# Patient Record
Sex: MALE | Race: WHITE | NOT HISPANIC OR LATINO | Employment: FULL TIME | URBAN - METROPOLITAN AREA
[De-identification: names, ages, dates, MRNs, and addresses within clinical notes are randomized per-mention and may not be internally consistent; named-entity substitution may affect disease eponyms.]

---

## 2020-05-20 ENCOUNTER — HOSPITAL ENCOUNTER (EMERGENCY)
Facility: HOSPITAL | Age: 35
Discharge: HOME | End: 2020-05-20
Attending: EMERGENCY MEDICINE | Admitting: EMERGENCY MEDICINE
Payer: COMMERCIAL

## 2020-05-20 ENCOUNTER — APPOINTMENT (EMERGENCY)
Dept: RADIOLOGY | Facility: HOSPITAL | Age: 35
End: 2020-05-20
Payer: COMMERCIAL

## 2020-05-20 VITALS
HEIGHT: 70 IN | HEART RATE: 88 BPM | RESPIRATION RATE: 20 BRPM | TEMPERATURE: 98.1 F | OXYGEN SATURATION: 96 % | SYSTOLIC BLOOD PRESSURE: 138 MMHG | DIASTOLIC BLOOD PRESSURE: 81 MMHG | BODY MASS INDEX: 37.62 KG/M2 | WEIGHT: 262.79 LBS

## 2020-05-20 DIAGNOSIS — S93.402A SPRAIN OF LEFT ANKLE, UNSPECIFIED LIGAMENT, INITIAL ENCOUNTER: Primary | ICD-10-CM

## 2020-05-20 PROCEDURE — 73610 X-RAY EXAM OF ANKLE: CPT

## 2020-05-20 PROCEDURE — 99283 EMERGENCY DEPT VISIT LOW MDM: CPT

## 2020-05-20 PROCEDURE — 99284 EMERGENCY DEPT VISIT MOD MDM: CPT | Performed by: NURSE PRACTITIONER

## 2020-05-20 RX ORDER — NAPROXEN 500 MG/1
500 TABLET ORAL 2 TIMES DAILY WITH MEALS
Qty: 20 TABLET | Refills: 0 | Status: SHIPPED | OUTPATIENT
Start: 2020-05-20 | End: 2020-05-30

## 2022-11-30 ENCOUNTER — OFFICE VISIT (OUTPATIENT)
Dept: OBGYN CLINIC | Facility: MEDICAL CENTER | Age: 37
End: 2022-11-30

## 2022-11-30 VITALS
HEIGHT: 70 IN | WEIGHT: 246 LBS | DIASTOLIC BLOOD PRESSURE: 81 MMHG | SYSTOLIC BLOOD PRESSURE: 119 MMHG | HEART RATE: 73 BPM | BODY MASS INDEX: 35.22 KG/M2

## 2022-11-30 DIAGNOSIS — M25.571 ACUTE RIGHT ANKLE PAIN: Primary | ICD-10-CM

## 2022-11-30 RX ORDER — ANASTROZOLE 1 MG/1
TABLET ORAL
COMMUNITY
Start: 2022-11-15

## 2022-11-30 RX ORDER — VALACYCLOVIR HYDROCHLORIDE 500 MG/1
TABLET, FILM COATED ORAL
COMMUNITY
Start: 2022-11-15

## 2022-11-30 RX ORDER — TESTOSTERONE CYPIONATE 200 MG/ML
INJECTION INTRAMUSCULAR
COMMUNITY
Start: 2022-10-14

## 2022-11-30 NOTE — PROGRESS NOTES
1  Acute right ankle pain  Ambulatory referral to Physical Therapy        Orders Placed This Encounter   Procedures   • Ambulatory referral to Physical Therapy        Impression:  Right ankle pain likely secondary to insertional Achilles tendinopathy  Patient would benefit from formal physical therapy for which I have placed an order  I will see him back in four weeks if needed  Imaging Studies (I personally reviewed images in PACS and report):  Right ankle x-rays most recent to this encounter reviewed  These images show very mild enthesopathic change at the Achilles tendon insertion  Enlarged navicular tuberosity  No acute osseous abnormalities    Return in about 4 weeks (around 12/28/2022)  Patient is in agreement with the above plan  HPI:  Javier Ramírez is a 42 y o  male  who presents for evaluation of   Chief Complaint   Patient presents with   • Right Ankle - Pain     Onset/Mechanism: Chronic pain that waxes and wanes  This past week he stepped on uneven surface and he felt pain  Location: Posterior ankle  Radiation: Denies  Provocative: Walking  Severity: Improved  Associated Symptoms: Denies  Treatment so far: Seen at urgent care  Following history reviewed and updated:  History reviewed  No pertinent past medical history  Past Surgical History:   Procedure Laterality Date   • BARIATRIC SURGERY  2014    gastric sleeve      Social History   Social History     Substance and Sexual Activity   Alcohol Use Yes     Social History     Substance and Sexual Activity   Drug Use Never     Social History     Tobacco Use   Smoking Status Never   Smokeless Tobacco Never     Family History   Problem Relation Age of Onset   • No Known Problems Mother    • No Known Problems Father      No Known Allergies     Constitutional:  /81   Pulse 73   Ht 5' 10" (1 778 m)   Wt 112 kg (246 lb)   BMI 35 30 kg/m²    General: NAD  Eyes: Anicteric sclerae  Neck: Supple    Lungs: Unlabored breathing  Cardiovascular: No lower extremity edema  Skin: Intact without erythema  Neurologic: Sensation intact to light touch  Psychiatric: Mood and affect are appropriate  Right Ankle Exam     Tenderness   Right ankle tenderness location: Myotendinous junction and insertion of Achilles  Swelling: none    Range of Motion   The patient has normal right ankle ROM  Muscle Strength   The patient has normal right ankle strength      Other   Erythema: absent  Scars: absent  Sensation: normal  Pulse: present              Procedures

## 2022-11-30 NOTE — PATIENT INSTRUCTIONS
You will be starting physical therapy  It is important to do home exercises as given by your physical therapist as you go through PT to speed up your recovery  Physical therapy addresses the problems that are causing your pain, instead of covering them up, as some other treatment options do  Room temperature/warm soaks with epsom salt can help with muscle tightness/cramping  Epsom salt releases magnesium which can be helpful

## 2022-12-21 ENCOUNTER — EVALUATION (OUTPATIENT)
Dept: PHYSICAL THERAPY | Facility: MEDICAL CENTER | Age: 37
End: 2022-12-21

## 2022-12-21 DIAGNOSIS — M25.571 ACUTE RIGHT ANKLE PAIN: ICD-10-CM

## 2022-12-21 NOTE — PROGRESS NOTES
PT Evaluation     Today's date: 2022  Patient name: Daysi Lewis  : 1985  MRN: [id number]  Referring provider: Saray Delong DO  Dx:   Encounter Diagnosis     ICD-10-CM    1  Acute right ankle pain  M25 [id number] Ambulatory referral to Physical Therapy          Start Time: 1150  Stop Time: 1229  Total time in clinic (min): 39 minutes    Assessment  Assessment details: Pt is a 42 y o male who presents with increased R ankle pain, decreased R ankle ROM, decreased LE strength and decreased activity tolerance  These impairments limit the patient from participating in ambulation at PLOF, decreased ability to complete stairs at PLOF, decreased ability to participate in regular exercise and decreased ability to participate in work related activity  Pt demonstrates significant lack of ankle DF ROM R>L  Pt ambulates on toes of R foot as there is pain when putting the heel to the floor  Calf stretching as well as ankle ROM exercises given today with good tolerance  Pt and therapist agree to perform follow up visits in the new year secondary to insurance  Pt was educated about the importance of improving calf mobility in order to improve walking mechanics  I believe this patient is a good candidate for and will benefit from skilled physical therapy for manual therapy to the R ankle, R ankle ROM exercises, LE strengthening exercises and mechanics training to improve symptoms and assist the patient to return to PLOF  Thank you for the opportunity to participate in [facility]      Positive Prognostic Indicators: desire to improve    Negative Prognostic Indicators: high symptom irritability   Impairments: abnormal gait, abnormal muscle tone, abnormal or restricted ROM, abnormal movement, activity intolerance, impaired physical strength, lacks appropriate home exercise program and pain with function    Symptom irritability: moderateUnderstanding of Dx/Px/POC: good   Prognosis: good    Goals  STGs: 4 weeks  1) Pt will have SPR decrease of 2 units at worst  2) pt will have improved R ankle DF AROM to neutral with knee bent  3) pt will have improved foto score of 10 points    LTGs: 8 weeks  1) pt will be independent with HEP by D/C  2) pt will be independent with symptom management by D/C  3) pt will demonstrate improved walking mechanics with appropriate heel strike and toe off in order to demonstrate appropriate gait mechanics by DC  Plan  Patient would benefit from: skilled physical therapy  Planned modality interventions: cryotherapy and thermotherapy: hydrocollator packs  Planned therapy interventions: joint mobilization, manual therapy, neuromuscular re-education, patient education, postural training, strengthening, stretching, therapeutic activities, therapeutic exercise, home exercise program, functional ROM exercises, flexibility, balance/weight bearing training and gait training  Frequency: 2x week  Duration in visits: 12  Plan of Care beginning date: 12/21/2022  Plan of Care expiration date: 3/15/2023  Treatment plan discussed with: patient        Subjective Evaluation    History of Present Illness  Mechanism of injury: Subjective Comments: pt was doing split squats and felt he tweaked his achilles  Couple weeks of tenderness with some pain (2-3 months ago)  Pt a couple week ago stepped in a depression of the ground and felt he pulled his achilles  Pt reports that the achilles is super tender today  He reports that mornings are better than night  He has a brace with some improvement  Pt reports that walking with his heel touching the ground causes increased pain  Pain   Rest: 5-6/10   Best: 2/10   Worst: 8-9/10    Relieving Factors: compensations, ice and compression    Exacerbating Factors: increased activity, prolonged rest then beginning activity    Sleeping: impairs sleeping    Home Set-up: stairs cane be challenging    Going down is harder than going up    ADLs: independent, increased difficulty Work/Hobbies: frequent gym go'er, has not returned since  Drives a lot for work  Previous Treatment: brace    Goals:  Improve pain and motion  Wants to return to gym             Objective     Tenderness     Right Ankle/Foot   Tenderness in the Achilles insertion  Additional Tenderness Details  Lateral calcaneous    Active Range of Motion   Left Ankle/Foot   Dorsiflexion (ke): -29 degrees   Dorsiflexion (kf): -2 degrees   Plantar flexion: 62 degrees   Inversion: 34 degrees   Eversion: 22 degrees     Right Ankle/Foot   Dorsiflexion (ke): -39 degrees   Dorsiflexion (kf): -20 degrees with pain  Plantar flexion: 62 degrees   Inversion: 26 degrees   Eversion: 10 degrees     Passive Range of Motion   Left Ankle/Foot    Dorsiflexion (kf): 0 degrees   Plantar flexion: 62 degrees     Right Ankle/Foot    Dorsiflexion (kf): -11 degrees with pain   Plantar flexion: 62 degrees   Inversion: 30 degrees   Eversion: 16 degrees     Strength/Myotome Testing     Left Ankle/Foot   Dorsiflexion: 5  Plantar flexion: 5  Inversion: 5  Eversion: 5    Right Ankle/Foot   Dorsiflexion: 4+  Plantar flexion: 5  Inversion: 5  Eversion: 5    Ambulation     Observational Gait   Decreased walking speed and stride length  Additional Observational Gait Details  Toe walking of R LE  Pt reports increased pain when putting heel to floor  Precautions: universal      Manuals 12/21            PROM R ankle             IASTM R ankle                                       Neuro Re-Ed             Ankle alphabet x1            FT balance                                                                              Ther Ex             Ankle 4 way x20 ea  Ankle circles x20 ea              gastroc stretch 10"x5            Soleus stretch 10"x5            bike             HR/TR                                       Ther Activity                                       Gait Training                                       Modalities

## 2023-06-05 ENCOUNTER — HOSPITAL ENCOUNTER (EMERGENCY)
Facility: HOSPITAL | Age: 38
Discharge: HOME | End: 2023-06-05
Attending: EMERGENCY MEDICINE
Payer: COMMERCIAL

## 2023-06-05 ENCOUNTER — APPOINTMENT (EMERGENCY)
Dept: RADIOLOGY | Facility: HOSPITAL | Age: 38
End: 2023-06-05
Payer: COMMERCIAL

## 2023-06-05 VITALS
HEART RATE: 81 BPM | OXYGEN SATURATION: 100 % | TEMPERATURE: 98.5 F | RESPIRATION RATE: 18 BRPM | SYSTOLIC BLOOD PRESSURE: 152 MMHG | DIASTOLIC BLOOD PRESSURE: 96 MMHG

## 2023-06-05 DIAGNOSIS — R03.0 ELEVATED BLOOD PRESSURE READING WITHOUT DIAGNOSIS OF HYPERTENSION: ICD-10-CM
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
## 2023-06-05 DIAGNOSIS — S29.9XXA INJURY OF UPPER BACK, INITIAL ENCOUNTER: ICD-10-CM
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
## 2023-06-05 DIAGNOSIS — M79.672 LEFT FOOT PAIN: ICD-10-CM

## 2023-06-05 PROCEDURE — 71046 X-RAY EXAM CHEST 2 VIEWS: CPT

## 2023-06-05 PROCEDURE — 73630 X-RAY EXAM OF FOOT: CPT

## 2023-06-06 NOTE — ED PROVIDER NOTES
History  Chief Complaint   Patient presents with   • Back Pain     Fell down stairs 5/27  States that he is having increased right sided back pain  States google says he has a broken rib  Complaints of left foot pain       HPI    Prior to Admission Medications   Prescriptions Last Dose Informant Patient Reported? Taking?   anastrozole (ARIMIDEX) 1 mg tablet  Self Yes No   naproxen (NAPROSYN) 500 mg tablet  Self No No   Sig: Take 1 tablet (500 mg total) by mouth 2 (two) times a day with meals for 20 doses   testosterone cypionate (DEPO-TESTOSTERONE) 200 mg/mL SOLN  Self Yes No   valACYclovir (VALTREX) 500 mg tablet  Self Yes No      Facility-Administered Medications: None       History reviewed  No pertinent past medical history  Past Surgical History:   Procedure Laterality Date   • BARIATRIC SURGERY  2014    gastric sleeve        Family History   Problem Relation Age of Onset   • No Known Problems Mother    • No Known Problems Father      I have reviewed and agree with the history as documented  E-Cigarette/Vaping   • E-Cigarette Use Never User      E-Cigarette/Vaping Substances   • Nicotine No    • THC No    • CBD No    • Flavoring No    • Other No    • Unknown No      Social History     Tobacco Use   • Smoking status: Never   • Smokeless tobacco: Never   Vaping Use   • Vaping Use: Never used   Substance Use Topics   • Alcohol use: Yes     Comment: socially   • Drug use: Yes     Types: Marijuana       Review of Systems    Physical Exam  Physical Exam  Vitals and nursing note reviewed  Constitutional:       General: He is not in acute distress  Appearance: He is well-developed  Comments: Hypertensive, does improve on repeat   HENT:      Head: Normocephalic and atraumatic  Eyes:      Conjunctiva/sclera: Conjunctivae normal       Pupils: Pupils are equal, round, and reactive to light  Neck:      Trachea: No tracheal deviation     Cardiovascular:      Rate and Rhythm: Normal rate and regular rhythm  Pulses:           Dorsalis pedis pulses are 2+ on the left side  Heart sounds: Normal heart sounds  Pulmonary:      Effort: Pulmonary effort is normal  No respiratory distress  Breath sounds: Normal breath sounds  Chest:      Chest wall: No lacerations, deformity, tenderness, crepitus or edema  Abdominal:      General: There is no distension  Palpations: Abdomen is soft  Tenderness: There is no abdominal tenderness  There is no right CVA tenderness or left CVA tenderness  Musculoskeletal:         General: No deformity  Normal range of motion  Cervical back: Full passive range of motion without pain, normal range of motion and neck supple  No spinous process tenderness or muscular tenderness  Normal range of motion  Thoracic back: Tenderness present  No bony tenderness  Lumbar back: No tenderness or bony tenderness  Back:       Left lower leg: No tenderness  No edema  Left ankle: No swelling  No tenderness  Normal range of motion  Left foot: Normal range of motion and normal capillary refill  Tenderness present  No swelling, deformity or crepitus  Feet:       Comments: No decreased sensation to foot   Skin:     General: Skin is warm and dry  Findings: No abrasion, bruising, ecchymosis or laceration  Neurological:      Mental Status: He is alert and oriented to person, place, and time  GCS: GCS eye subscore is 4  GCS verbal subscore is 5  GCS motor subscore is 6     Psychiatric:         Behavior: Behavior normal          Vital Signs  ED Triage Vitals [06/05/23 2050]   Temperature Pulse Respirations Blood Pressure SpO2   98 5 °F (36 9 °C) 81 18 (!) 193/103 100 %      Temp Source Heart Rate Source Patient Position - Orthostatic VS BP Location FiO2 (%)   Temporal Monitor Sitting Left arm --      Pain Score       --           Vitals:    06/05/23 2050 06/05/23 2230   BP: (!) 193/103 152/96   Pulse: 81    Patient Position - Orthostatic VS: Sitting Lying         Visual Acuity      ED Medications  Medications - No data to display    Diagnostic Studies  Results Reviewed     None                 XR chest 2 views    (Results Pending)   XR foot 3+ views LEFT    (Results Pending)              Procedures  Procedures         ED Course                               SBIRT 20yo+    Flowsheet Row Most Recent Value   Initial Alcohol Screen: US AUDIT-C     1  How often do you have a drink containing alcohol? 0 Filed at: 06/05/2023 2217   2  How many drinks containing alcohol do you have on a typical day you are drinking? 0 Filed at: 06/05/2023 2217   3a  Male UNDER 65: How often do you have five or more drinks on one occasion? 0 Filed at: 06/05/2023 2217   Audit-C Score 0 Filed at: 06/05/2023 2217   GENE: How many times in the past year have you    Used an illegal drug or used a prescription medication for non-medical reasons? Never Filed at: 06/05/2023 2217                    Medical Decision Making  This is a 67-year-old male who presents here today with 2 separate complaints  First, he endorses right-sided lower thoracic back pain  On 5/27 he slipped going down the stairs, which he attributes to wearing new socks  He says he landed on his back, and slid down about 5-6 steps  He denies other injuries from the fall  He did golf a couple of days immediately after the fall  He says sometimes taking deep breaths makes the pain worse, but sometimes does seem to improve it  He says it is worse at night while he is sleeping, as well as with certain positions and movements  He has no anterior chest pain, actual shortness of breath, palpitations, lightheadedness, syncope or presyncope, abdominal pain, hematuria, urinary symptoms  He has tried over-the-counter pain medications as well as topical medications with some improvement of pain    However, he is concerned that he might have a broken rib and is doing continued activities may cause complications  Secondly, he endorses left dorsal foot pain  He says overnight 6/1, to get comfortable because of his back, he was lying partially from, partially on his side, with that area of his foot pressing on the corner of the mattress  He woke up with the pain  He says he sometimes gets paresthesias to the area  Pain is worse when he first gets up  He denies blunt trauma, prior problems to his foot  He denies complete loss of sensation, or any associated weakness  He takes no blood thinning medications  He denies any other complaints  ROS: Otherwise negative, unless stated as above  He is well-appearing, no acute distress  He has some tenderness to the right posterior lower rib cage without external signs of trauma  He does have some tenderness to the foot without loss of sensation, decreased range of motion  Exam is otherwise unremarkable  X-ray had been obtained by nursing from triage prior to evaluation by myself  He has no pneumothorax, obvious displaced rib fractures, acute abnormalities of the foot  He may have a nondisplaced rib fracture, however given length of time since injury, no significant symptoms, no pneumothorax, I am not concerned enough about complications related to this, especially as he is not having significant pain that would require further management for it, for which she would require CT scan to evaluate further  I am not concerned about underlying liver or kidney injury from the fall for which she would need an abdominal CT  Most likely neuropraxia due to prolonged positioning of that area  I discussed with him findings, management at home, follow-up, and indications for return, and he expresses understanding with this plan  Of note, he was hypertensive on arrival with some improvement on repeat though not completely normal   He has no prior history of hypertension    He was advised to monitor this at home, and follow-up with PCP for further evaluation if it remains elevated  Elevated blood pressure reading without diagnosis of hypertension: acute illness or injury  Fall down stairs: acute illness or injury  Fall on same level from slipping, tripping or stumbling, initial encounter: acute illness or injury  Injury of upper back, initial encounter: acute illness or injury  Left foot pain: acute illness or injury  Amount and/or Complexity of Data Reviewed  Radiology: ordered and independent interpretation performed  Decision-making details documented in ED Course  Risk  OTC drugs  Disposition  Final diagnoses:   Fall on same level from slipping, tripping or stumbling, initial encounter   Fall down stairs   Injury of upper back, initial encounter   Left foot pain   Elevated blood pressure reading without diagnosis of hypertension     Time reflects when diagnosis was documented in both MDM as applicable and the Disposition within this note     Time User Action Codes Description Comment    6/5/2023 10:39 PM Dawna Chao Add [W01  0XXA] Fall on same level from slipping, tripping or stumbling, initial encounter     6/5/2023 10:39 PM Dawna Chao Add Eugenia Gold Fall down stairs     6/5/2023 10:40 PM Anabel-Dawna Fleming Add [S29  9XXA] Injury of upper back, initial encounter     6/5/2023 10:46 PM Dawna Chao Add [K99 344] Left foot pain     6/5/2023 10:46 PM Dawna Chao Add [R03 0] Elevated blood pressure reading without diagnosis of hypertension       ED Disposition     ED Disposition   Discharge    Condition   Good    Date/Time   Mon Jun 5, 2023 2227    [address] [place] [address] discharge to home/self care           Follow-up Information     Follow up With Specialties Details Why Contact Info Additional Information    Chin Baxter DO  Schedule an appointment as soon as possible for a visit  As needed, to follow up on your symptoms and blood pressure [phone] [facility]  [place] [address]  [place] [address] Orthopedic Surgery Schedule an appointment as soon as possible for a visit  As needed, to follow up on your foot [address] [facility]  [address] [id number]  [address] [facility], [address], [place], [address], [id number] [phone]          Patient's Medications   Discharge Prescriptions    No medications on file       No discharge procedures on file      PDMP Review     None          ED Provider  Electronically Signed by           Sofía Bowers MD  06/07/23 1200

## 2023-06-06 NOTE — DISCHARGE INSTRUCTIONS
Take ibuprofen (Motrin, Advil) or acetaminophen (Tylenol) as needed for pain, as per the instructions  Use ice or heat, and topical medications to the area to help with pain  Do activities as you are able to tolerate  Keep your leg elevated at rest and do gentle range of motion exercises to the ankle  Your blood pressure was elevated in the emergency department  Monitor it at home, as if it stays elevated, your doctor may need to start you on medications for this  Follow-up with your primary care doctor to make sure you are doing better  If you do not have significant improvement of your foot, follow-up with orthopedics for further evaluation

## 2024-09-11 ENCOUNTER — OFFICE VISIT (OUTPATIENT)
Dept: URGENT CARE | Facility: CLINIC | Age: 39
End: 2024-09-11
Payer: COMMERCIAL

## 2024-09-11 VITALS
TEMPERATURE: 97.9 F | RESPIRATION RATE: 18 BRPM | OXYGEN SATURATION: 100 % | DIASTOLIC BLOOD PRESSURE: 78 MMHG | SYSTOLIC BLOOD PRESSURE: 132 MMHG | HEART RATE: 97 BPM

## 2024-09-11 DIAGNOSIS — J06.9 ACUTE URI: Primary | ICD-10-CM

## 2024-09-11 PROCEDURE — G0382 LEV 3 HOSP TYPE B ED VISIT: HCPCS | Performed by: NURSE PRACTITIONER

## 2024-09-11 RX ORDER — AZITHROMYCIN 250 MG/1
TABLET, FILM COATED ORAL
Qty: 6 TABLET | Refills: 0 | Status: SHIPPED | OUTPATIENT
Start: 2024-09-11 | End: 2024-09-15

## 2024-09-11 RX ORDER — ALLOPURINOL 100 MG/1
100 TABLET ORAL DAILY
COMMUNITY

## 2024-09-11 RX ORDER — PREDNISONE 20 MG/1
40 TABLET ORAL DAILY
Qty: 10 TABLET | Refills: 0 | Status: SHIPPED | OUTPATIENT
Start: 2024-09-11 | End: 2024-09-16

## 2024-09-11 NOTE — PROGRESS NOTES
[facility]        NAME: Nehemias Lundberg is a 39 y.o. male  : 1985    MRN: [id number]  DATE: 2024  TIME: 12:46 PM    Assessment and Plan   Acute URI [J06.9]  1. Acute URI  predniSONE 20 mg tablet    azithromycin (ZITHROMAX) 250 mg tablet        Continue fluids and decongestants as needed. Zpack and prednisone as directed.     Patient Instructions       Follow up with PCP in 3-5 days.  Proceed to  ER if symptoms worsen.    If tests are performed, our office will contact you with results only if changes need to made to the care plan discussed with you at the visit. [facility]    Chief Complaint     Chief Complaint   Patient presents with    Cold Like Symptoms     Pt c/o sore throat and headache that started 10 days ago and has now gone into chest and has taken covid test which was negative and has taken mucinex and dayquil/nyquil         History of Present Illness       Feels like chest is more congested at night    URI   This is a new problem. The current episode started 1 to 4 weeks ago (10 days). The problem has been gradually worsening. There has been no fever. Associated symptoms include congestion and coughing. Pertinent negatives include no abdominal pain, chest pain, diarrhea, dysuria, ear pain, headaches, joint pain, joint swelling, nausea, neck pain, plugged ear sensation, rash, rhinorrhea, sinus pain, sneezing, sore throat, swollen glands, vomiting or wheezing. He has tried antihistamine, decongestant and increased fluids for the symptoms. The treatment provided mild relief.       Review of Systems   Review of Systems   Constitutional:  Negative for chills, fatigue and fever.   HENT:  Positive for congestion. Negative for ear pain, rhinorrhea, sinus pain, sneezing and sore throat.    Respiratory:  Positive for cough. Negative for wheezing.    Cardiovascular:  Negative for chest pain.   Gastrointestinal:  Negative for abdominal pain,  diarrhea, nausea and vomiting.   Genitourinary:  Negative for dysuria.   Musculoskeletal:  Negative for joint pain and neck pain.   Skin:  Negative for rash.   Neurological:  Negative for headaches.         Current Medications       Current Outpatient Medications:     allopurinol (ZYLOPRIM) 100 mg tablet, Take 100 mg by mouth daily, Disp: , Rfl:     anastrozole (ARIMIDEX) 1 mg tablet, , Disp: , Rfl:     azithromycin (ZITHROMAX) 250 mg tablet, Take 2 tablets today then 1 tablet daily x 4 days, Disp: 6 tablet, Rfl: 0    predniSONE 20 mg tablet, Take 2 tablets (40 mg total) by mouth daily for 5 days, Disp: 10 tablet, Rfl: 0    testosterone cypionate (DEPO-TESTOSTERONE) 200 mg/mL SOLN, , Disp: , Rfl:     valACYclovir (VALTREX) 500 mg tablet, , Disp: , Rfl:     naproxen (NAPROSYN) 500 mg tablet, Take 1 tablet (500 mg total) by mouth 2 (two) times a day with meals for 20 doses, Disp: 20 tablet, Rfl: 0    Current Allergies     Allergies as of 09/11/2024    (No Known Allergies)            The following portions of the patient's history were reviewed and updated as appropriate: allergies, current medications, past family history, past medical history, past social history, past surgical history and problem list.     History reviewed. No pertinent past medical history.    Past Surgical History:   Procedure Laterality Date    BARIATRIC SURGERY  2014    gastric sleeve        Family History   Problem Relation Age of Onset    No Known Problems Mother     No Known Problems Father          Medications have been verified.        Objective   /78   Pulse 97   Temp 97.9 °F (36.6 °C) (Tympanic)   Resp 18   SpO2 100%        Physical Exam     Physical Exam  Vitals and nursing note reviewed.   Constitutional:       General: He is not in acute distress.     Appearance: Normal appearance. He is not ill-appearing.   HENT:      Head: Normocephalic and atraumatic.      Right Ear: Tympanic membrane, ear canal and external ear normal.       Left Ear: Tympanic membrane and ear canal normal.      Nose: Congestion and rhinorrhea present.      Mouth/Throat:      Mouth: Mucous membranes are moist.      Pharynx: Posterior oropharyngeal erythema present. No oropharyngeal exudate.   Eyes:      Pupils: Pupils are equal, round, and reactive to light.   Cardiovascular:      Rate and Rhythm: Normal rate and regular rhythm.      Pulses: Normal pulses.      Heart sounds: Normal heart sounds.   Pulmonary:      Effort: Pulmonary effort is normal.      Breath sounds: Normal breath sounds.      Comments: dry cough  Musculoskeletal:      Cervical back: Normal range of motion and neck supple.   Skin:     General: Skin is warm and dry.   Neurological:      Mental Status: He is alert.